# Patient Record
Sex: FEMALE | Race: WHITE | NOT HISPANIC OR LATINO | Employment: PART TIME | URBAN - METROPOLITAN AREA
[De-identification: names, ages, dates, MRNs, and addresses within clinical notes are randomized per-mention and may not be internally consistent; named-entity substitution may affect disease eponyms.]

---
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
## 2020-03-24 ENCOUNTER — NURSE TRIAGE (OUTPATIENT)
Dept: OTHER | Facility: OTHER | Age: 20
End: 2020-03-24

## 2020-03-25 ENCOUNTER — TELEMEDICINE (OUTPATIENT)
Dept: FAMILY MEDICINE CLINIC | Facility: CLINIC | Age: 20
End: 2020-03-25
Payer: COMMERCIAL

## 2020-03-25 ENCOUNTER — TELEPHONE (OUTPATIENT)
Dept: FAMILY MEDICINE CLINIC | Facility: CLINIC | Age: 20
End: 2020-03-25

## 2020-03-25 VITALS — BODY MASS INDEX: 23.1 KG/M2 | WEIGHT: 137 LBS | HEART RATE: 42 BPM

## 2020-03-25 DIAGNOSIS — Z11.4 SCREENING FOR HIV (HUMAN IMMUNODEFICIENCY VIRUS): ICD-10-CM

## 2020-03-25 DIAGNOSIS — R11.2 NAUSEA AND VOMITING, INTRACTABILITY OF VOMITING NOT SPECIFIED, UNSPECIFIED VOMITING TYPE: Primary | ICD-10-CM

## 2020-03-25 DIAGNOSIS — L70.9 ACNE, UNSPECIFIED ACNE TYPE: ICD-10-CM

## 2020-03-25 DIAGNOSIS — Z11.3 SCREENING FOR STDS (SEXUALLY TRANSMITTED DISEASES): ICD-10-CM

## 2020-03-25 DIAGNOSIS — M79.10 MYALGIA: ICD-10-CM

## 2020-03-25 DIAGNOSIS — Z13.1 SCREENING FOR DIABETES MELLITUS: ICD-10-CM

## 2020-03-25 DIAGNOSIS — F41.8 ANXIETY ASSOCIATED WITH DEPRESSION: ICD-10-CM

## 2020-03-25 DIAGNOSIS — Z30.9 ENCOUNTER FOR CONTRACEPTIVE MANAGEMENT, UNSPECIFIED TYPE: ICD-10-CM

## 2020-03-25 DIAGNOSIS — Z13.6 SCREENING FOR CARDIOVASCULAR CONDITION: ICD-10-CM

## 2020-03-25 DIAGNOSIS — F31.60 BIPOLAR AFFECTIVE DISORDER, CURRENT EPISODE MIXED, CURRENT EPISODE SEVERITY UNSPECIFIED (HCC): ICD-10-CM

## 2020-03-25 PROCEDURE — 99214 OFFICE O/P EST MOD 30 MIN: CPT | Performed by: FAMILY MEDICINE

## 2020-03-25 RX ORDER — NORETHINDRONE ACETATE AND ETHINYL ESTRADIOL 1; .02 MG/1; MG/1
1 TABLET ORAL DAILY
Qty: 90 TABLET | Refills: 1 | Status: SHIPPED | OUTPATIENT
Start: 2020-03-25 | End: 2021-10-28 | Stop reason: SDUPTHER

## 2020-03-25 RX ORDER — QUETIAPINE FUMARATE 300 MG/1
300 TABLET, FILM COATED ORAL
Qty: 90 TABLET | Refills: 1 | Status: SHIPPED | OUTPATIENT
Start: 2020-03-25 | End: 2021-07-29 | Stop reason: DRUGHIGH

## 2020-03-25 NOTE — PROGRESS NOTES
Virtual Regular Visit    Problem List Items Addressed This Visit        Musculoskeletal and Integument    Acne    Relevant Medications    norethindrone-ethinyl estradiol (MICROGESTIN 1/20) 1-20 MG-MCG per tablet       Other    Anxiety associated with depression     Stable on Seroquel 300 mg nightly  Relevant Medications    QUEtiapine (SEROquel) 300 mg tablet    Other Relevant Orders    CBC and differential    Comprehensive metabolic panel    TSH, 3rd generation with Free T4 reflex    Bipolar affective disorder, current episode mixed (HCC)     Stable on Seroquel 300 mg nightly  Relevant Medications    QUEtiapine (SEROquel) 300 mg tablet    Other Relevant Orders    CBC and differential    Comprehensive metabolic panel    TSH, 3rd generation with Free T4 reflex    Hemoglobin A1C      Other Visit Diagnoses     Nausea and vomiting, intractability of vomiting not specified, unspecified vomiting type    -  Primary    Screening for HIV (human immunodeficiency virus)        Relevant Orders    HIV 1/2 Antigen/Antibody (4th Generation) w Reflex [facility]    Screening for STDs (sexually transmitted diseases)        Relevant Orders    Chlamydia/GC amplified DNA by PCR    Screening for diabetes mellitus        Relevant Orders    Hemoglobin A1C    Screening for cardiovascular condition        Relevant Orders    CBC and differential    Comprehensive metabolic panel    Lipid panel    LDL cholesterol, direct    Myalgia        Relevant Orders    Vitamin D 25 hydroxy    Encounter for contraceptive management, unspecified type        Relevant Medications    norethindrone-ethinyl estradiol (MICROGESTIN 1/20) 1-20 MG-MCG per tablet               Reason for visit is Vomiting    Encounter provider Patrice Chua DO    Provider located at [address] [place] [address]  [address]  [facility] [address] [phone]      Recent Visits  No visits were found meeting these conditions  Showing recent visits within past 7 days and meeting all other requirements     Today's Visits  Date Type Provider Dept   03/25/20 Telemedicine DO Nickolas Whittaker   03/25/20 Telephone Alondra White   Showing today's visits and meeting all other requirements     Future Appointments  Date Type Provider Dept   03/25/20 Telemedicine DO Nickolas Whittaker   Showing future appointments within next 150 days and meeting all other requirements        After connecting through [employer], the patient was identified by name and date of birth  Ovidio Berry was informed that this is a telemedicine visit and that the visit is being conducted through [employer] which may not be secure and therefore, might not be HIPAA-compliant  My office door was closed  No one else was in the room  She acknowledged consent and understanding of privacy and security of the video platform  The patient has agreed to participate and understands they can discontinue the visit at any time  Subjective  Ovidio Berry is a 21 y o  female   Past Medical History:   Diagnosis Date    Anxiety     Bipolar affective disorder, current episode mixed ([facility] [address] ) 6/11/2019    Depression     GERD (gastroesophageal reflux disease)     Insomnia     Insomnia due to other mental disorder 6/11/2019    OM (otitis media), recurrent     As child       Past Surgical History:   Procedure Laterality Date    NO PAST SURGERIES         Current Outpatient Medications   Medication Sig Dispense Refill    QUEtiapine (SEROquel) 300 mg tablet Take 1 tablet (300 mg total) by mouth daily at bedtime 90 tablet 1    norethindrone-ethinyl estradiol (MICROGESTIN 1/20) 1-20 MG-MCG per tablet Take 1 tablet by mouth daily 90 tablet 1     No current facility-administered medications for this visit           Allergies   Allergen Reactions    Penicillins Hives and Shortness Of Breath    Peppermint Flavor Shortness Of Breath    Chlorpheniramine-Phenylephrine Hives and Edema     Reaction Date: 23Aug2011;     Peppermint Oil      Annotation - [id number]: oral tingling    Sulfa Antibiotics Edema       Review of Systems     See other note  Physical Exam     Constitutional: She is oriented to person, place, and time  She appears well-developed and well-nourished  No distress  HENT:   Head: Normocephalic and atraumatic  Right Ear: External ear normal    Left Ear: External ear normal    Nose: Nose normal    Moist mucous membranes   Eyes: Conjunctivae and EOM are normal  Right eye exhibits no discharge  Left eye exhibits no discharge  No scleral icterus  Neck: Normal range of motion  No thyromegaly present  Cardiovascular: Normal rate and regular rhythm  Pulmonary/Chest: Effort normal  No stridor  No respiratory distress  She has no wheezes  Musculoskeletal: She exhibits no edema  Lymphadenopathy:     She has no cervical adenopathy  Neurological: She is alert and oriented to person, place, and time  Skin: No rash noted  She is not diaphoretic  Psychiatric: She has a normal mood and affect  Her behavior is normal  Judgment and thought content normal    Nursing note and vitals reviewed  I spent 36 minutes with the patient during this visit      [id number]

## 2020-03-25 NOTE — PROGRESS NOTES
Assessment/Plan:  Problem List Items Addressed This Visit        Musculoskeletal and Integument    Acne    Relevant Medications    norethindrone-ethinyl estradiol (MICROGESTIN 1/20) 1-20 MG-MCG per tablet       Other    Anxiety associated with depression     Stable on Seroquel 300 mg nightly  Relevant Medications    QUEtiapine (SEROquel) 300 mg tablet    Other Relevant Orders    CBC and differential    Comprehensive metabolic panel    TSH, 3rd generation with Free T4 reflex    Bipolar affective disorder, current episode mixed (HCC)     Stable on Seroquel 300 mg nightly  Relevant Medications    QUEtiapine (SEROquel) 300 mg tablet    Other Relevant Orders    CBC and differential    Comprehensive metabolic panel    TSH, 3rd generation with Free T4 reflex    Hemoglobin A1C      Other Visit Diagnoses     Nausea and vomiting, intractability of vomiting not specified, unspecified vomiting type    -  Primary    Suspect viral illness  Push fluids, BRAT diet  Ok to return to work  Screening for HIV (human immunodeficiency virus)        Relevant Orders    HIV 1/2 Antigen/Antibody (4th Generation) w Reflex [facility]    Screening for STDs (sexually transmitted diseases)        Relevant Orders    Chlamydia/GC amplified DNA by PCR    Screening for diabetes mellitus        Relevant Orders    Hemoglobin A1C    Screening for cardiovascular condition        Relevant Orders    CBC and differential        Comprehensive metabolic panel    Lipid panel    LDL cholesterol, direct    Myalgia        Relevant Orders    Vitamin D 25 hydroxy    Encounter for contraceptive management, unspecified type        Relevant Medications    norethindrone-ethinyl estradiol (MICROGESTIN 1/20) 1-20 MG-MCG per tablet           Return in about 4 months (around 7/25/2020) for Physical / 4mo - BPD, Contraception, Labs  No future appointments       Subjective:     Moise Roland is a 21 y o  female who presents today for a follow-up on her acute medical conditions  HPI:  Chief Complaint   Patient presents with    Virtual Regular Visit    Vomiting     Happened twice in 1 week   Contraception     has not been taking it, has been off of it for 5 months  Denies being sexually active  -- Above per clinical staff and reviewed  --    HPI      Today:      Triage for COVID-19:  Do you have a cough? No  Shortness of breath? No  Fever? No  In the last 14 days     Have your traveled  including high risk countries ([place], [place], [place], [place], [place], [facility]), high risk  states ([place], [place], [place], [facility], [place], [place], [place], [place], [place]), high risk local areas ([place], [place], [place]), commute to/from [place] or [place]? No  Have you been in close contact to anyone with COVID-19? No  Have you been in close contact with anyone with suspected COVID-19? No  Is this patient from a high risk group? Older adults, chronic health conditions (DM, heart disease, immunocompromised, lung disease, kidney disease)  No  Updated 3/18/2020      From nurse phone note:    Placed call to patient and she stated that she had vomited once 4 days ago and again yesterday  Both times it happened early morning for patient while she was at work  She denied any fever, cough or shortness of breath  Patient did state that she was lightheaded after she vomited but not before  Using Telephone Triage Protocols book for Nurses 5th edition by Bridger Gates page 820 patient denied any fainting, vomiting blood, recent injury to head or abdomen, denied any chest pain or discomfort, palpations or sweating  Patient also denied any abdominal pain, constipation or diarrhea  Patient stated she does not believe she is dehydrated because she is using the bathroom and voiding as normal and still drinking a lot of water  Denies any travel and no possibility of pregnancy  Please advise  Watching diet          Vomiting - Symptoms x 4 days - only 2 occurrences, last occurred yesterday at 7am, then 4 days ago in AM   No nausea  +Fluids  She did not eat breakfast the mornings she vomited  She usually eats breakfasts  No spoiled food ingestion, recent antibiotics, or recent travel  No sick contacts  Both instances occurred at work - Works as a  in [employer] at [employer] , sanitizing hangs, cleaning every other customer  Bipolar Disorder - Taking Seroquel 300mg QHS  She has not taking her Rx irregularly x 1 month after grandmother passed away  She has been taking her Rx regularly x 2 months  No SI/HI/AH/VH  Good social supports  She is taking a gap year from college  She is considering returning to [employer] Fall 2020  Contraception - Stopped OCP 5 months ago after grandmother's death  She feels better mentally and thinks she will use OCP when she decides to be sexually active in the future  LMP 3/11/20  Not currently sexually active  Last sexually active Summer 2019  The following portions of the patient's history were reviewed and updated as appropriate: allergies, current medications, past family history, past medical history, past social history, past surgical history and problem list       Review of Systems   Constitutional: Positive for chills (Yesterday prior to vomiting ) and diaphoresis (Yesterday prior to vomiting )  Negative for appetite change, fatigue and fever  Respiratory: Negative for cough, chest tightness and shortness of breath  Cardiovascular: Negative for chest pain  Gastrointestinal: Positive for vomiting  Negative for abdominal pain, blood in stool, diarrhea and nausea  Genitourinary: Negative for dysuria          Current Outpatient Medications   Medication Sig Dispense Refill    QUEtiapine (SEROquel) 300 mg tablet Take 1 tablet (300 mg total) by mouth daily at bedtime 90 tablet 1    norethindrone-ethinyl estradiol (MICROGESTIN 1/20) 1-20 MG-MCG per tablet Take 1 tablet by mouth daily 90 tablet 1     No current facility-administered medications for this visit  Objective:  Pulse (!) 42 Comment: Per Patient  Wt 62 1 kg (137 lb) Comment: Per Patient  LMP 03/11/2020   Breastfeeding No   BMI 23 10 kg/m²    Wt Readings from Last 3 Encounters:   03/25/20 62 1 kg (137 lb)   07/24/19 65 2 kg (143 lb 12 8 oz) (75 %, Z= 0 68)*   06/11/19 67 kg (147 lb 9 6 oz) (79 %, Z= 0 81)*     * Growth percentiles are based on [facility] (Girls, 2-20 Years) data  BP Readings from Last 3 Encounters:   07/24/19 108/60   06/11/19 112/60   09/11/17 (!) 102/60 (18 %, Z = -0 91 /  24 %, Z = -0 71)*     *BP percentiles are based on the 2017 AAP Clinical Practice Guideline for girls          Physical Exam   Constitutional: She is oriented to person, place, and time  She appears well-developed and well-nourished  No distress  HENT:   Head: Normocephalic and atraumatic  Right Ear: External ear normal    Left Ear: External ear normal    Nose: Nose normal    Moist mucous membranes   Eyes: Conjunctivae and EOM are normal  Right eye exhibits no discharge  Left eye exhibits no discharge  No scleral icterus  Neck: Normal range of motion  No thyromegaly present  Cardiovascular: Normal rate and regular rhythm  Pulmonary/Chest: Effort normal  No stridor  No respiratory distress  She has no wheezes  Musculoskeletal: She exhibits no edema  Lymphadenopathy:     She has no cervical adenopathy  Neurological: She is alert and oriented to person, place, and time  Skin: No rash noted  She is not diaphoretic  Psychiatric: She has a normal mood and affect  Her behavior is normal  Judgment and thought content normal    Nursing note and vitals reviewed        Lab Results:      Lab Results   Component Value Date    WBC 5 05 06/26/2019    HGB 13 0 06/26/2019    HCT 39 3 06/26/2019     06/26/2019    TRIG 65 06/26/2019    HDL 47 06/26/2019    ALT 23 06/26/2019    AST 20 06/26/2019     12/19/2015    K 4 1 06/26/2019     06/26/2019    CREATININE 0 78 06/26/2019    BUN 9 06/26/2019    CO2 24 06/26/2019    GLUF 88 06/26/2019    HGBA1C 5 4 06/26/2019     No results found for: URICACID  Invalid input(s): BASENAME Vitamin D    No results found       POCT Labs

## 2020-03-25 NOTE — TELEPHONE ENCOUNTER
Placed call to patient and she stated that she had vomited once 4 days ago and again yesterday  Both times it happened early morning for patient while she was at work  She denied any fever, cough or shortness of breath  Patient did state that she was lightheaded after she vomited but not before  Using Telephone Triage Protocols book for Nurses 5th edition by Joaquin Amador page 650 patient denied any fainting, vomiting blood, recent injury to head or abdomen, denied any chest pain or discomfort, palpations or sweating  Patient also denied any abdominal pain, constipation or diarrhea  Patient stated she does not believe she is dehydrated because she is using the bathroom and voiding as normal and still drinking a lot of water  Denies any travel and no possibility of pregnancy  Please advise

## 2020-03-25 NOTE — TELEPHONE ENCOUNTER
Regarding: Vomitting  ----- Message from Cristian Ponce sent at 3/24/2020  7:18 PM EDT -----  "I have been throwing up "

## 2020-03-25 NOTE — TELEPHONE ENCOUNTER
Patient called, she has been vomiting on/off for a few days now, only in the morning and she does not know why, she feels fine otherwise and she knows she is definitely not pregnant so she is not quite sure what is going on, since it is only in the morning and it is not everyday  Please call patient with advice

## 2020-03-25 NOTE — PATIENT INSTRUCTIONS
Please contact your insurance if you are uncertain of coverage for plan of care items  Your insurance may not cover the cost of your Vitamin D blood test, which is approximately $65-70  Please notify the lab prior to blood draw if you would like to decline this test       Cholesterol and Your Health   AMBULATORY CARE:   Cholesterol  is a waxy, fat-like substance  Cholesterol is made by your body, but also comes from certain foods you eat  Your body uses cholesterol to make hormones and new cells  Your body also uses cholesterol to protect nerves  Cholesterol comes from foods such as meat and dairy products  Your total cholesterol level is made up by LDL cholesterol, HDL cholesterol, and triglycerides:  · LDL cholesterol  is called bad cholesterol  because it forms plaque in your arteries  As plaque builds up, your arteries become narrow, and less blood flows through  When plaque decreases blood flow to your heart, you may have chest pain  If plaque completely blocks an artery that bring blood to your heart, you may have a heart attack  Plaque can break off and form blood clots  Blood clots may block arteries in your brain and cause a stroke  · HDL cholesterol  is called good cholesterol  because it helps remove LDL cholesterol from your arteries  It does this by attaching to LDL cholesterol and carrying it to your liver  Your liver breaks down LDL cholesterol so your body can get rid of it  High levels of HDL cholesterol can help prevent a heart attack and stroke  Low levels of HDL cholesterol can increase your risk for heart disease, heart attack, and stroke  · Triglycerides  are a type of fat that store energy from foods you eat  High levels of triglycerides also cause plaque buildup  This can increase your risk for a heart attack or stroke  If your triglyceride level is high, your LDL cholesterol level may also be high    How food affects your cholesterol levels:   · Unhealthy fats  increase LDL cholesterol and triglyceride levels in your blood  They are found in foods high in cholesterol, saturated fat, and trans fat:     ¨ Cholesterol  is found in eggs, dairy, and meat  ¨ Saturated fat  is found in butter, cheese, ice cream, whole milk, and coconut oil  Saturated fat is also found in meat, such as sausage, hot dogs, and bologna  ¨ Trans fat  is found in liquid oils and is used in fried and baked foods  Foods that contain trans fats include chips, crackers, muffins, sweet rolls, microwave popcorn, and cookies  · Healthy fats,  also called unsaturated fats, help lower LDL cholesterol and triglyceride levels  Healthy fats include the following:     ¨ Monounsaturated fats  are found in foods such as olive oil, canola oil, avocado, nuts, and olives  ¨ Polyunsaturated fats,  such as omega 3 fats, are found in fish, such as salmon, trout, and tuna  They can also be found in plant foods such as flaxseed, walnuts, and soybeans  Other things that affect your cholesterol levels:   · Smoking cigarettes    · Being overweight or obese     · Drinking large amounts of alcohol    · Not enough exercise or no exercise    · Certain genes passed from your parents to you  What you need to know about having your cholesterol levels checked: Adults 21to 39years of age should have their cholesterol levels checked every 4 to 6 years  Adults 45 years and older should have their cholesterol checked every 1 to 2 years  You may need your cholesterol checked more often, or at a younger age, if you have risk factors for heart disease  You may also need to have your cholesterol checked more often if you have other health conditions, such as diabetes  Blood tests are used to check cholesterol levels  Blood tests measure your levels of triglycerides, LDL cholesterol, and HDL cholesterol  Cholesterol level goals: Your cholesterol level goal may depend on your risk for heart disease   It may also depend on your age and other health conditions  Ask your healthcare provider if the following goals are right for you:  · Your total cholesterol level  should be less than 200 mg/dL  This number may also depend on your HDL and LDL cholesterol goals  · Your LDL cholesterol level  should be less than 130 mg/dL  · Your HDL cholesterol level  should be 60 mg/dL or higher  · Your triglyceride level  should be less than 150 mg/dL  Treatment for high cholesterol:  Treatment for high cholesterol will also decrease your risk of heart disease, heart attack, and stroke  Treatment may include any of the following:  · Medicines  may be given to lower your LDL cholesterol, triglyceride levels, or total cholesterol level  You may need medicines to lower your cholesterol if any of the following is true:     ¨ You have a history of stroke, TIA, unstable angina, or a heart attack    ¨ Your LDL cholesterol level is 190 mg/dL or higher    ¨ You are age 36to 76years of age, have diabetes, and your LDL cholesterol is 70 mg/dL or higher    ¨ You are age 36to 76years of age, have risk factors for heart disease, and your LDL cholesterol is 70 mg/dL or higher    · Lifestyle changes  include changes to your diet, exercise, weight loss, and quitting smoking  It also includes decreasing the amount of alcohol you drink  · Supplements  include fish oil, red yeast rice, and garlic  Fish oil may help lower your triglyceride and LDL cholesterol levels  It may also increase your HDL cholesterol level  Red yeast rice may help decrease your total cholesterol level and LDL cholesterol level  Garlic may help lower your total cholesterol level  Do not take these supplements without talking to your healthcare provider  Nutrition to help lower your cholesterol levels:  A registered dietitian can help you create a healthy eating plan  Read food labels and choose foods low in saturated fat, trans fats, and cholesterol    · Decrease the total amount of fat you eat   Choose lean meats, fat-free or 1% fat milk, and low-fat dairy products, such as yogurt and cheese  Try to limit or avoid red meats  Limit or do not eat fried foods or baked goods such as cookies  · Replace unhealthy fats with healthy fats  Cook foods in olive oil or canola oil  Choose soft margarines that are low in saturated fat and trans fat  Seeds, nuts, and avocados are other examples of healthy fats  · Eat foods with omega-3 fats  Examples include salmon, tuna, mackerel, walnuts, and flaxseed  Eat fish 2 times per week  Children and pregnant women should not eat fish that have high levels of mercury, such as shark, swordfish, and justine mackerel  · Increase the amount of plant-based foods you eat  Plant-based foods are low in cholesterol and fat  Eating more of these foods may help lower your cholesterol and help you lose weight  Examples of plant-based foods includes fruits, vegetables, legumes, and whole grains  Replace milk that contains dairy with almond, soy, or coconut milk  Eat beans and foods with soy for protein instead of meat  Ask your healthcare provider or dietitian for more information on plant-based foods  · Increase the amount of fiber you eat  High-fiber foods can help lower your LDL cholesterol  You should eat between 20 and 30 grams of fiber each day  Eat at least 5 servings of fruits and vegetables each day  Other examples of high-fiber foods include whole-grain or whole-wheat breads, pastas, or cereals, and brown rice  Eat 3 ounces of whole-grain foods each day  Increase fiber slowly  You may have abdominal discomfort, bloating, and gas if you add fiber to your diet too quickly  Lifestyle changes you can make to help lower your cholesterol levels:   · Maintain a healthy weight  Ask your healthcare provider how much you should weigh  Ask him or her to help you create a weight loss plan if you are overweight   Weight loss can decrease your total cholesterol and triglyceride levels  · Exercise regularly  Exercise can help lower your total cholesterol level and maintain a healthy weight  Exercise can also help increase your HDL cholesterol level  Work with your healthcare provider to create an exercise program that is right for you  Get at least 30 minutes of moderate exercise most days of the week  Examples of exercise include brisk walking, swimming, or biking  · Do not smoke  Nicotine and other chemicals in cigarettes and cigars can damage your lungs, heart, and blood vessels  They can also raise your triglyceride levels  Ask your healthcare provider for information if you currently smoke and need help to quit  E-cigarettes or smokeless tobacco still contain nicotine  Talk to your healthcare provider before you use these products  · Limit or do not drink alcohol  Alcohol can increase your triglyceride levels  Ask your healthcare provider if it is safe for you to drink alcohol  Also ask how much is safe for you to drink each day  © 2017 [facility] Information is for End User's use only and may not be sold, redistributed or otherwise used for commercial purposes  All illustrations and images included in CareNotes® are the copyrighted property of [employer] , [employer]  or Hunter Martinez  The above information is an  only  It is not intended as medical advice for individual conditions or treatments  Talk to your doctor, nurse or pharmacist before following any medical regimen to see if it is safe and effective for you

## 2020-03-27 NOTE — PROGRESS NOTES
I scheduled patient for 7/27/2020 @ 10am for PE and left her a message to please call back to confirm that this day and time will work for her    If she calls back please confirm and then I will mail out her AVS and lab slips

## 2020-04-02 NOTE — TELEPHONE ENCOUNTER
Chandrika,  Spoke with Megan Cullen via phone call, she appears to be severely depressed, was crying on the phone, feels hopeless  She admitted had  S/I a few days ago  Also  ran away from home a week ago and was placed back home with parents  by [employer]  She stated cut off her hair  Wanted to jump off the bridge a week ago  I instructed her to go to the Emergency Room right away for psych evaluation  I don't want her to wait for months to see a psychiatrist   She agreed  Pharmacy requesting refill: Hydrocodone   Last OV: 02/28/2020  Next OV: 04/10/2020  Last refill: 03/09/2020      Please advise.

## 2020-07-24 ENCOUNTER — TELEPHONE (OUTPATIENT)
Dept: FAMILY MEDICINE CLINIC | Facility: CLINIC | Age: 20
End: 2020-07-24

## 2021-07-29 ENCOUNTER — OFFICE VISIT (OUTPATIENT)
Dept: FAMILY MEDICINE CLINIC | Facility: CLINIC | Age: 21
End: 2021-07-29
Payer: COMMERCIAL

## 2021-07-29 VITALS
WEIGHT: 145.4 LBS | SYSTOLIC BLOOD PRESSURE: 120 MMHG | HEIGHT: 65 IN | RESPIRATION RATE: 14 BRPM | HEART RATE: 69 BPM | BODY MASS INDEX: 24.22 KG/M2 | OXYGEN SATURATION: 99 % | TEMPERATURE: 97.5 F | DIASTOLIC BLOOD PRESSURE: 62 MMHG

## 2021-07-29 DIAGNOSIS — F33.1 MDD (MAJOR DEPRESSIVE DISORDER), RECURRENT EPISODE, MODERATE (HCC): ICD-10-CM

## 2021-07-29 DIAGNOSIS — Z11.4 SCREENING FOR HIV (HUMAN IMMUNODEFICIENCY VIRUS): ICD-10-CM

## 2021-07-29 DIAGNOSIS — Z29.9 PREVENTIVE MEASURE: ICD-10-CM

## 2021-07-29 DIAGNOSIS — F99 INSOMNIA DUE TO OTHER MENTAL DISORDER: ICD-10-CM

## 2021-07-29 DIAGNOSIS — Z23 NEED FOR VACCINATION: ICD-10-CM

## 2021-07-29 DIAGNOSIS — Z11.59 NEED FOR HEPATITIS C SCREENING TEST: ICD-10-CM

## 2021-07-29 DIAGNOSIS — F41.8 ANXIETY ASSOCIATED WITH DEPRESSION: ICD-10-CM

## 2021-07-29 DIAGNOSIS — F31.60 BIPOLAR AFFECTIVE DISORDER, CURRENT EPISODE MIXED, CURRENT EPISODE SEVERITY UNSPECIFIED (HCC): ICD-10-CM

## 2021-07-29 DIAGNOSIS — Z79.899 HIGH RISK MEDICATION USE: ICD-10-CM

## 2021-07-29 DIAGNOSIS — F51.05 INSOMNIA DUE TO OTHER MENTAL DISORDER: ICD-10-CM

## 2021-07-29 DIAGNOSIS — Z11.3 SCREENING EXAMINATION FOR STD (SEXUALLY TRANSMITTED DISEASE): ICD-10-CM

## 2021-07-29 DIAGNOSIS — Z00.00 WELL ADULT EXAM: Primary | ICD-10-CM

## 2021-07-29 PROCEDURE — 99395 PREV VISIT EST AGE 18-39: CPT | Performed by: FAMILY MEDICINE

## 2021-07-29 PROCEDURE — 3008F BODY MASS INDEX DOCD: CPT | Performed by: FAMILY MEDICINE

## 2021-07-29 PROCEDURE — 90471 IMMUNIZATION ADMIN: CPT | Performed by: FAMILY MEDICINE

## 2021-07-29 PROCEDURE — 1036F TOBACCO NON-USER: CPT | Performed by: FAMILY MEDICINE

## 2021-07-29 PROCEDURE — 99214 OFFICE O/P EST MOD 30 MIN: CPT | Performed by: FAMILY MEDICINE

## 2021-07-29 PROCEDURE — 90715 TDAP VACCINE 7 YRS/> IM: CPT | Performed by: FAMILY MEDICINE

## 2021-07-29 PROCEDURE — 3725F SCREEN DEPRESSION PERFORMED: CPT | Performed by: FAMILY MEDICINE

## 2021-07-29 RX ORDER — ESCITALOPRAM OXALATE 10 MG/1
10 TABLET ORAL DAILY
Qty: 30 TABLET | Refills: 5 | Status: SHIPPED | OUTPATIENT
Start: 2021-07-29 | End: 2021-10-25

## 2021-07-29 RX ORDER — QUETIAPINE FUMARATE 100 MG/1
50-100 TABLET, FILM COATED ORAL
Qty: 30 TABLET | Refills: 1 | Status: SHIPPED | OUTPATIENT
Start: 2021-07-29 | End: 2021-10-25
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
## 2021-07-29 NOTE — PROGRESS NOTES
Assessment/Plan:     Problem List Items Addressed This Visit        Unprioritized    Anxiety associated with depression    Relevant Medications    QUEtiapine (SEROquel) 100 mg tablet    escitalopram (LEXAPRO) 10 mg tablet    Other Relevant Orders    Ambulatory referral to Psychiatry    Bipolar affective disorder, current episode mixed (HCC)    Relevant Medications    QUEtiapine (SEROquel) 100 mg tablet    escitalopram (LEXAPRO) 10 mg tablet    Other Relevant Orders    Ambulatory referral to Psychiatry    Insomnia due to other mental disorder    Relevant Medications    QUEtiapine (SEROquel) 100 mg tablet    escitalopram (LEXAPRO) 10 mg tablet    Other Relevant Orders    Ambulatory referral to Psychiatry      Other Visit Diagnoses     Well adult exam    -  Primary    Relevant Orders    Comprehensive metabolic panel    Lipid panel    HIV 1/2 Antigen/Antibody (4th Generation) w Reflex [facility]    Hepatitis C antibody    Need for hepatitis C screening test        Relevant Orders    Comprehensive metabolic panel    Lipid panel    HIV 1/2 Antigen/Antibody (4th Generation) w Reflex [facility]    Hepatitis C antibody    Screening for HIV (human immunodeficiency virus)        Relevant Orders    Comprehensive metabolic panel    Lipid panel    HIV 1/2 Antigen/Antibody (4th Generation) w Reflex [facility]    Hepatitis C antibody    High risk medication use        Relevant Orders    Comprehensive metabolic panel    Lipid panel    HIV 1/2 Antigen/Antibody (4th Generation) w Reflex [facility]    Hepatitis C antibody    Screening examination for STD (sexually transmitted disease)        Relevant Orders    Chlamydia/GC amplified DNA by PCR    Preventive measure        Relevant Orders    Ambulatory referral to Gynecology    MDD (major depressive disorder), recurrent episode, moderate (HCC)        Relevant Medications    QUEtiapine (SEROquel) 100 mg tablet    escitalopram (LEXAPRO) 10 mg tablet    Other Relevant Orders    Ambulatory referral to Psychiatry    Need for vaccination        Relevant Orders    TDAP VACCINE GREATER THAN OR EQUAL TO 6YO IM (Completed)          Well adult exam  ·         Continue healthy diet   ·         Encourage exercise 4 times a week or more for minimum 30 minutes  ·         Continue to see dentist, wear seatbelt  ·         Health maintenance reviewed - Tdap today  Agrees to update labs  Refer for first pap  Agrees to Chlamydia screening  Reviewed age appropriate health maintenance screenings and immunizations that are due, risks and benefits of these  Health Maintenance   Topic Date Due    Hepatitis C Screening  Never done    COVID-19 Vaccine (1) Never done    HIV Screening  Never done    BMI: Adult  Never done    Chlamydia Screening  2018    Cervical Cancer Screening  Never done    Influenza Vaccine (1) 2021    Annual Physical  2022    DTaP,Tdap,and Td Vaccines (7 - Td or Tdap) 2031    HIB Vaccine  Completed    Hepatitis B Vaccine  Completed    IPV Vaccine  Completed    HPV Vaccine  Completed    Pneumococcal Vaccine: Pediatrics (0 to 5 Years) and At-Risk Patients (6 to 59 Years)  Aged Out    Hepatitis A Vaccine  Aged Out    Meningococcal ACWY Vaccine  Aged Out     Return in about 6 weeks (around 2021) for mood check (in person or virtual)       Subjective:    SARITA Daniels is a 24 y o  female who presents today for a physical      Chief Complaint   Patient presents with    Anxiety     PHQ-9 Depression Screening    PHQ-9:   Frequency of the following problems over the past two weeks:      Little interest or pleasure in doing things: 0 - not at all  Feeling down, depressed, or hopeless: 1 - several days  Trouble falling or staying asleep, or sleeping too much: 2 - more than half the days  Feeling tired or having little energy: 2 - more than half the days  Poor appetite or overeatin - not at all  Feeling bad about yourself - or that you are a failure or have let yourself or your family down: 1 - several days  Trouble concentrating on things, such as reading the newspaper or watching television: 0 - not at all  Moving or speaking so slowly that other people could have noticed  Or the opposite - being so fidgety or restless that you have been moving around a lot more than usual: 0 - not at all  Thoughts that you would be better off dead, or of hurting yourself in some way: 0 - not at all  PHQ-2 Score: 1  PHQ-9 Score: 6        ---Above per clinical staff & reviewed  ---  Patient here today for a physical:    New to me - physical     Concerns today:  See other note -          The following portions of the patient's history were reviewed and updated as appropriate: allergies, current medications, past family history, past medical history, past social history, past surgical history and problem list      Current Outpatient Medications   Medication Sig Dispense Refill    norethindrone-ethinyl estradiol (MICROGESTIN 1/20) 1-20 MG-MCG per tablet Take 1 tablet by mouth daily 90 tablet 1    escitalopram (LEXAPRO) 10 mg tablet Take 1 tablet (10 mg total) by mouth daily 30 tablet 5    QUEtiapine (SEROquel) 100 mg tablet Take 0 5-1 tablets ( mg total) by mouth daily at bedtime 30 tablet 1     No current facility-administered medications for this visit  Objective:      /62   Pulse 69   Temp 97 5 °F (36 4 °C)   Resp 14   Ht 5' 4 57" (1 64 m)   Wt 66 kg (145 lb 6 4 oz)   SpO2 99%   BMI 24 52 kg/m²   BP Readings from Last 3 Encounters:   07/29/21 120/62   07/24/19 108/60   06/11/19 112/60     Wt Readings from Last 3 Encounters:   07/29/21 66 kg (145 lb 6 4 oz)   03/25/20 62 1 kg (137 lb)   07/24/19 65 2 kg (143 lb 12 8 oz) (75 %, Z= 0 68)*     * Growth percentiles are based on CDC (Girls, 2-20 Years) data  Review of Systems  ROS:  all others negative - no chest pain, SOB, normal urine and bowels  no GERD  not sleeping well  mood changes    Physical Exam Constitutional: she appears well-developed and well-nourished  HENT: Head: Normocephalic  Right Ear: External ear normal  Tympanic membrane normal    Left Ear: External ear normal  Tympanic membrane normal    Nose: Nose normal  No mucosal edema, No rhinorrhea  Right sinus exhibits no maxillary sinus tenderness  Left sinus exhibits no maxillary sinus tenderness  Mouth/Throat: Oropharynx is clear and moist    Eyes: Normal conjunctiva  No erythema  No discharge  Neck: No pain on exam  Neck supple  Cardiovascular: Normal rate, regular rhythm and normal heart sounds  Pulmonary/Chest: Effort normal and breath sounds normal  No wheezes  No rales  No rhonchi  Abdominal: Soft  Bowel sounds are normal  There is no tenderness  Musculoskeletal: she exhibits no edema  Lymphadenopathy: she has no cervical adenopathy  Neurological: she  is alert and oriented to person, place, and time  Skin: Skin is warm and dry  No rashes  Psychiatric: she  has a normal mood and affect  her behavior is normal  Thought content normal    Vitals reviewed

## 2021-08-02 ENCOUNTER — TELEPHONE (OUTPATIENT)
Dept: PSYCHIATRY | Facility: CLINIC | Age: 21
End: 2021-08-02

## 2021-08-19 ENCOUNTER — TELEPHONE (OUTPATIENT)
Dept: PSYCHIATRY | Facility: CLINIC | Age: 21
End: 2021-08-19

## 2021-08-19 NOTE — TELEPHONE ENCOUNTER
Behavorial Health Outpatient Intake Questions    Referred by: PCP    Please advised interviewee that they need to answer all questions truthfully to allow for best care and any misrepresentations of information may affect their ability to be seen at this clinic   => Was this discussed? Yes     Behavorial Health Outpatient Intake History -     Presenting Problem (in patient's words):   Severe panic attacks that have been affecting her daily life, Bipolar, depression.    Are there any developmental disabilities? ? If yes, can they speak to you on the phone? If they are too limited to speak to you on phone, refer out Yes    Are you taking any psychiatric medications? Yes    => If yes, who prescribes? If yes, are they injectable medications?   escitalopram (LEXAPRO)  QUEtiapine (SEROquel)     Does the patient have a language barrier or hearing impairment? No    Have you been treated at [facility] by a therapist or a doctor in the past? If yes, who? No    Has the patient been hospitalized for mental health? Yes   If yes, how long ago was last hospitalization and where was it?  3 years ago, patient does not recall where.    Do you actively use alcohol or marijuana or illegal substances? If yes, what and how much - refer out to Drug and alcohol treatment if use is excessive or daily use of illegal substances No concerns of substance abuse are reported.    Do you have a community treatment team or ? No    Legal History-     Does the patient have any history of arrests, correction/senior living time, or DUIs? No  If Yes-  1) What types of charges?  2) When were they last incarcerated?  3) Are they currently on parole or probation?    Minor Child-    Who has custody of the child?     Is there a custody agreement?     If there is a custody agreement remind parent that they must bring a copy to the first appt or they will not be seen.     Intake Team, please check with provider before scheduling if flags come up such as:  -  complex case  - legal history (other than DUI)  - communication barrier concerns are present  - if, in your judgment, this needs further review    ACCEPTED as a patient Yes  => Appointment Date: 10/25/2021 at 8:00 a.m with Dr. Solano    Referred Elsewhere? No    Name of Insurance Co: [employer]  Insurance ID# [id number]  Insurance Phone #  If ins is primary or secondary  If patient is a minor, parents information such as Name, D.O.B of guarantor.

## 2021-09-05 ENCOUNTER — TELEPHONE (OUTPATIENT)
Dept: FAMILY MEDICINE CLINIC | Facility: CLINIC | Age: 21
End: 2021-09-05

## 2021-09-07 NOTE — TELEPHONE ENCOUNTER
Left detailed voicemail (ok per communication form) for pt to have fasting labs done prior to upcoming appointment  Instructed pt to call office if she needs labs faxed somewhere other than [facility]

## 2021-10-25 ENCOUNTER — OFFICE VISIT (OUTPATIENT)
Dept: PSYCHIATRY | Facility: CLINIC | Age: 21
End: 2021-10-25
Payer: COMMERCIAL

## 2021-10-25 DIAGNOSIS — F33.1 MDD (MAJOR DEPRESSIVE DISORDER), RECURRENT EPISODE, MODERATE (HCC): Primary | ICD-10-CM

## 2021-10-25 DIAGNOSIS — F43.10 PTSD (POST-TRAUMATIC STRESS DISORDER): ICD-10-CM

## 2021-10-25 DIAGNOSIS — F41.8 ANXIETY ASSOCIATED WITH DEPRESSION: ICD-10-CM

## 2021-10-25 PROBLEM — F31.60 BIPOLAR AFFECTIVE DISORDER, CURRENT EPISODE MIXED (HCC): Status: RESOLVED | Noted: 2019-06-11 | Resolved: 2021-10-25

## 2021-10-25 PROCEDURE — 90792 PSYCH DIAG EVAL W/MED SRVCS: CPT | Performed by: STUDENT IN AN ORGANIZED HEALTH CARE EDUCATION/TRAINING PROGRAM

## 2021-10-25 RX ORDER — ESCITALOPRAM OXALATE 10 MG/1
10 TABLET ORAL DAILY
Qty: 30 TABLET | Refills: 1 | Status: SHIPPED | OUTPATIENT
Start: 2021-10-25 | End: 2021-11-18

## 2021-10-25 RX ORDER — QUETIAPINE FUMARATE 50 MG/1
200 TABLET, EXTENDED RELEASE ORAL
Qty: 30 TABLET | Refills: 1 | Status: SHIPPED | OUTPATIENT
Start: 2021-10-25 | End: 2021-12-22

## 2021-11-03 ENCOUNTER — HOSPITAL ENCOUNTER (EMERGENCY)
Facility: HOSPITAL | Age: 21
Discharge: HOME | End: 2021-11-03
Attending: EMERGENCY MEDICINE | Admitting: EMERGENCY MEDICINE
Payer: COMMERCIAL

## 2021-11-03 VITALS
TEMPERATURE: 98.2 F | DIASTOLIC BLOOD PRESSURE: 65 MMHG | SYSTOLIC BLOOD PRESSURE: 116 MMHG | WEIGHT: 145 LBS | OXYGEN SATURATION: 100 % | BODY MASS INDEX: 24.45 KG/M2 | RESPIRATION RATE: 18 BRPM | HEART RATE: 94 BPM

## 2021-11-03 DIAGNOSIS — R19.7 DIARRHEA: ICD-10-CM

## 2021-11-03 DIAGNOSIS — R11.2 NAUSEA & VOMITING: Primary | ICD-10-CM

## 2021-11-03 LAB
ALBUMIN SERPL BCP-MCNC: 4.5 G/DL (ref 3.5–5)
ALP SERPL-CCNC: 61 U/L (ref 46–116)
ALT SERPL W P-5'-P-CCNC: 21 U/L (ref 12–78)
ANION GAP SERPL CALCULATED.3IONS-SCNC: 15 MMOL/L (ref 4–13)
AST SERPL W P-5'-P-CCNC: 12 U/L (ref 5–45)
BACTERIA UR QL AUTO: ABNORMAL /HPF
BASE EX.OXY STD BLDV CALC-SCNC: 78.3 % (ref 60–80)
BASE EXCESS BLDV CALC-SCNC: -5.4 MMOL/L
BASOPHILS # BLD AUTO: 0.08 THOUSANDS/ΜL (ref 0–0.1)
BASOPHILS NFR BLD AUTO: 1 % (ref 0–1)
BILIRUB SERPL-MCNC: 0.35 MG/DL (ref 0.2–1)
BILIRUB UR QL STRIP: NEGATIVE
BUN SERPL-MCNC: 10 MG/DL (ref 5–25)
CALCIUM SERPL-MCNC: 9.1 MG/DL (ref 8.3–10.1)
CHLORIDE SERPL-SCNC: 104 MMOL/L (ref 100–108)
CLARITY UR: CLEAR
CO2 SERPL-SCNC: 20 MMOL/L (ref 21–32)
COLOR UR: YELLOW
CREAT SERPL-MCNC: 0.88 MG/DL (ref 0.6–1.3)
EOSINOPHIL # BLD AUTO: 0.18 THOUSAND/ΜL (ref 0–0.61)
EOSINOPHIL NFR BLD AUTO: 1 % (ref 0–6)
ERYTHROCYTE [DISTWIDTH] IN BLOOD BY AUTOMATED COUNT: 12.5 % (ref 11.6–15.1)
EXT PREG TEST URINE: NEGATIVE
EXT. CONTROL ED NAV: NORMAL
GFR SERPL CREATININE-BSD FRML MDRD: 94 ML/MIN/1.73SQ M
GLUCOSE SERPL-MCNC: 147 MG/DL (ref 65–140)
GLUCOSE SERPL-MCNC: 91 MG/DL (ref 65–140)
GLUCOSE UR STRIP-MCNC: NEGATIVE MG/DL
HCG SERPL QL: NEGATIVE
HCO3 BLDV-SCNC: 20.4 MMOL/L (ref 24–30)
HCT VFR BLD AUTO: 42.6 % (ref 34.8–46.1)
HGB BLD-MCNC: 13.7 G/DL (ref 11.5–15.4)
HGB UR QL STRIP.AUTO: NEGATIVE
IMM GRANULOCYTES # BLD AUTO: 0.05 THOUSAND/UL (ref 0–0.2)
IMM GRANULOCYTES NFR BLD AUTO: 0 % (ref 0–2)
KETONES UR STRIP-MCNC: ABNORMAL MG/DL
LEUKOCYTE ESTERASE UR QL STRIP: NEGATIVE
LIPASE SERPL-CCNC: 120 U/L (ref 73–393)
LYMPHOCYTES # BLD AUTO: 2.52 THOUSANDS/ΜL (ref 0.6–4.47)
LYMPHOCYTES NFR BLD AUTO: 17 % (ref 14–44)
MCH RBC QN AUTO: 27.3 PG (ref 26.8–34.3)
MCHC RBC AUTO-ENTMCNC: 32.2 G/DL (ref 31.4–37.4)
MCV RBC AUTO: 85 FL (ref 82–98)
MONOCYTES # BLD AUTO: 0.56 THOUSAND/ΜL (ref 0.17–1.22)
MONOCYTES NFR BLD AUTO: 4 % (ref 4–12)
MUCOUS THREADS UR QL AUTO: ABNORMAL
NEUTROPHILS # BLD AUTO: 11.56 THOUSANDS/ΜL (ref 1.85–7.62)
NEUTS SEG NFR BLD AUTO: 77 % (ref 43–75)
NITRITE UR QL STRIP: NEGATIVE
NON-SQ EPI CELLS URNS QL MICRO: ABNORMAL /HPF
NRBC BLD AUTO-RTO: 0 /100 WBCS
O2 CT BLDV-SCNC: 13.8 ML/DL
PCO2 BLDV: 41 MM HG (ref 42–50)
PH BLDV: 7.32 [PH] (ref 7.3–7.4)
PH UR STRIP.AUTO: 6 [PH]
PLATELET # BLD AUTO: 258 THOUSANDS/UL (ref 149–390)
PMV BLD AUTO: 10.5 FL (ref 8.9–12.7)
PO2 BLDV: 43.1 MM HG (ref 35–45)
POTASSIUM SERPL-SCNC: 4 MMOL/L (ref 3.5–5.3)
PROT SERPL-MCNC: 7.5 G/DL (ref 6.4–8.2)
PROT UR STRIP-MCNC: ABNORMAL MG/DL
RBC # BLD AUTO: 5.01 MILLION/UL (ref 3.81–5.12)
RBC #/AREA URNS AUTO: ABNORMAL /HPF
SODIUM SERPL-SCNC: 139 MMOL/L (ref 136–145)
SP GR UR STRIP.AUTO: >=1.03 (ref 1–1.03)
UROBILINOGEN UR QL STRIP.AUTO: 0.2 E.U./DL
WBC # BLD AUTO: 14.95 THOUSAND/UL (ref 4.31–10.16)
WBC #/AREA URNS AUTO: ABNORMAL /HPF

## 2021-11-03 PROCEDURE — 96375 TX/PRO/DX INJ NEW DRUG ADDON: CPT

## 2021-11-03 PROCEDURE — 82948 REAGENT STRIP/BLOOD GLUCOSE: CPT

## 2021-11-03 PROCEDURE — 82805 BLOOD GASES W/O2 SATURATION: CPT | Performed by: EMERGENCY MEDICINE

## 2021-11-03 PROCEDURE — 83690 ASSAY OF LIPASE: CPT

## 2021-11-03 PROCEDURE — 81001 URINALYSIS AUTO W/SCOPE: CPT | Performed by: EMERGENCY MEDICINE

## 2021-11-03 PROCEDURE — 81025 URINE PREGNANCY TEST: CPT | Performed by: EMERGENCY MEDICINE

## 2021-11-03 PROCEDURE — 96361 HYDRATE IV INFUSION ADD-ON: CPT

## 2021-11-03 PROCEDURE — 96374 THER/PROPH/DIAG INJ IV PUSH: CPT

## 2021-11-03 PROCEDURE — 36415 COLL VENOUS BLD VENIPUNCTURE: CPT

## 2021-11-03 PROCEDURE — 80053 COMPREHEN METABOLIC PANEL: CPT

## 2021-11-03 PROCEDURE — 84703 CHORIONIC GONADOTROPIN ASSAY: CPT | Performed by: EMERGENCY MEDICINE

## 2021-11-03 PROCEDURE — 85025 COMPLETE CBC W/AUTO DIFF WBC: CPT

## 2021-11-03 PROCEDURE — 99283 EMERGENCY DEPT VISIT LOW MDM: CPT

## 2021-11-03 PROCEDURE — 99285 EMERGENCY DEPT VISIT HI MDM: CPT | Performed by: EMERGENCY MEDICINE

## 2021-11-03 RX ORDER — ONDANSETRON 2 MG/ML
4 INJECTION INTRAMUSCULAR; INTRAVENOUS ONCE
Status: COMPLETED | OUTPATIENT
Start: 2021-11-03 | End: 2021-11-03

## 2021-11-03 RX ORDER — LORAZEPAM 2 MG/ML
0.2 INJECTION INTRAMUSCULAR ONCE
Status: COMPLETED | OUTPATIENT
Start: 2021-11-03 | End: 2021-11-03

## 2021-11-03 RX ORDER — ONDANSETRON 2 MG/ML
4 INJECTION INTRAMUSCULAR; INTRAVENOUS ONCE
Status: DISCONTINUED | OUTPATIENT
Start: 2021-11-03 | End: 2021-11-03

## 2021-11-03 RX ORDER — ONDANSETRON 4 MG/1
4 TABLET, ORALLY DISINTEGRATING ORAL EVERY 6 HOURS PRN
Qty: 16 TABLET | Refills: 0 | Status: SHIPPED | OUTPATIENT
Start: 2021-11-03

## 2021-11-03 RX ORDER — ACETAMINOPHEN 325 MG/1
650 TABLET ORAL ONCE
Status: COMPLETED | OUTPATIENT
Start: 2021-11-03 | End: 2021-11-03

## 2021-11-03 RX ORDER — SUCRALFATE 1 G/1
1 TABLET ORAL ONCE
Status: COMPLETED | OUTPATIENT
Start: 2021-11-03 | End: 2021-11-03

## 2021-11-03 RX ADMIN — ACETAMINOPHEN 650 MG: 325 TABLET, FILM COATED ORAL at 05:31

## 2021-11-03 RX ADMIN — ONDANSETRON 4 MG: 2 INJECTION INTRAMUSCULAR; INTRAVENOUS at 04:23

## 2021-11-03 RX ADMIN — SODIUM CHLORIDE 1000 ML: 0.9 INJECTION, SOLUTION INTRAVENOUS at 04:30

## 2021-11-03 RX ADMIN — LORAZEPAM 0.2 MG: 2 INJECTION INTRAMUSCULAR; INTRAVENOUS at 05:31

## 2021-11-03 RX ADMIN — SUCRALFATE 1 G: 1 TABLET ORAL at 05:31

## 2021-11-03 RX ADMIN — SODIUM CHLORIDE 1000 ML: 0.9 INJECTION, SOLUTION INTRAVENOUS at 05:31

## 2021-11-18 DIAGNOSIS — F33.1 MDD (MAJOR DEPRESSIVE DISORDER), RECURRENT EPISODE, MODERATE (HCC): ICD-10-CM

## 2021-11-18 DIAGNOSIS — F41.8 ANXIETY ASSOCIATED WITH DEPRESSION: ICD-10-CM

## 2021-11-18 RX ORDER — ESCITALOPRAM OXALATE 10 MG/1
TABLET ORAL
Qty: 90 TABLET | Refills: 1 | Status: SHIPPED | OUTPATIENT
Start: 2021-11-18 | End: 2021-12-22

## 2021-12-22 ENCOUNTER — TELEMEDICINE (OUTPATIENT)
Dept: PSYCHIATRY | Facility: CLINIC | Age: 21
End: 2021-12-22

## 2021-12-22 DIAGNOSIS — F41.8 ANXIETY ASSOCIATED WITH DEPRESSION: ICD-10-CM

## 2021-12-22 DIAGNOSIS — F33.1 MDD (MAJOR DEPRESSIVE DISORDER), RECURRENT EPISODE, MODERATE (HCC): ICD-10-CM

## 2021-12-22 RX ORDER — ESCITALOPRAM OXALATE 10 MG/1
10 TABLET ORAL DAILY
Qty: 90 TABLET | Refills: 1 | Status: SHIPPED | OUTPATIENT
Start: 2021-12-22

## 2021-12-22 RX ORDER — QUETIAPINE FUMARATE 50 MG/1
50 TABLET, EXTENDED RELEASE ORAL
Qty: 30 TABLET | Refills: 1 | Status: SHIPPED | OUTPATIENT
Start: 2021-12-22

## 2021-12-23 ENCOUNTER — TELEPHONE (OUTPATIENT)
Dept: PSYCHIATRY | Facility: CLINIC | Age: 21
End: 2021-12-23

## 2022-03-28 ENCOUNTER — DOCUMENTATION (OUTPATIENT)
Dept: PSYCHIATRY | Facility: CLINIC | Age: 22
End: 2022-03-28
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
## 2022-03-31 ENCOUNTER — TELEPHONE (OUTPATIENT)
Dept: PSYCHIATRY | Facility: CLINIC | Age: 22
End: 2022-03-31

## 2022-03-31 NOTE — TELEPHONE ENCOUNTER
DISCHARGE LETTER for Dr Eliseo Rocha (certified and regular) placed in outgoing mail on 3/31/2022      Article #:  [id number]    Address:  [place] [address]  [phone]

## 2022-04-13 NOTE — TELEPHONE ENCOUNTER
Certificate for the Discharge Letter was signed/received in April 2022  A copy has been scanned into Media